# Patient Record
Sex: FEMALE | Race: ASIAN | NOT HISPANIC OR LATINO
[De-identification: names, ages, dates, MRNs, and addresses within clinical notes are randomized per-mention and may not be internally consistent; named-entity substitution may affect disease eponyms.]

---

## 2022-12-05 ENCOUNTER — APPOINTMENT (OUTPATIENT)
Dept: UROLOGY | Facility: CLINIC | Age: 57
End: 2022-12-05

## 2022-12-05 VITALS
OXYGEN SATURATION: 96 % | HEIGHT: 60 IN | TEMPERATURE: 97.5 F | SYSTOLIC BLOOD PRESSURE: 145 MMHG | BODY MASS INDEX: 22.97 KG/M2 | WEIGHT: 117 LBS | RESPIRATION RATE: 16 BRPM | DIASTOLIC BLOOD PRESSURE: 89 MMHG | HEART RATE: 89 BPM

## 2022-12-05 DIAGNOSIS — Z80.1 FAMILY HISTORY OF MALIGNANT NEOPLASM OF TRACHEA, BRONCHUS AND LUNG: ICD-10-CM

## 2022-12-05 DIAGNOSIS — Z00.00 ENCOUNTER FOR GENERAL ADULT MEDICAL EXAMINATION W/OUT ABNORMAL FINDINGS: ICD-10-CM

## 2022-12-05 DIAGNOSIS — Z78.9 OTHER SPECIFIED HEALTH STATUS: ICD-10-CM

## 2022-12-05 PROCEDURE — 99204 OFFICE O/P NEW MOD 45 MIN: CPT

## 2022-12-05 RX ORDER — LORATADINE AND PSEUDOEPHEDRINE 10; 240 MG/1; MG/1
10-240 TABLET, EXTENDED RELEASE ORAL
Qty: 15 | Refills: 0 | Status: ACTIVE | COMMUNITY
Start: 2022-12-03

## 2022-12-05 NOTE — LETTER BODY
[FreeTextEntry1] : Rosario Huerta MD\par [address] [facility] [address]\[place] [address]\par [phone]\par \par Dear Dr. Huerta, \par \par Reason for visit: Microscopic hematuria\par \par This is a 57 year-old Mandarin speaking woman with microscopic hematuria referred for evaluation. Her urinalysis demonstrated evidence of microscopic hematuria. She denies any gross hematuria, dysuria or urinary incontinence. The patient denies any aggravating or relieving factors. The patient denies any interference of function. The patient is entirely asymptomatic. All other review of systems are negative. She has lung cancer in her family medical history. She has no previous surgical history. Past medical history, family history and social history were inquired and were noncontributory to current condition. The patient does not use tobacco and does alcohol. Medications and allergies were reviewed. She has no known allergies to medication. \par \par On examination, the patient is a healthy-appearing woman in no acute distress. She is alert and oriented follows commands. She  has normal mood and affect. She is normocephalic. Neck is supple. Oral no thrush. Respirations are unlabored. Abdomen is soft and nontender. Bladder is nonpalpable. No CVA tenderness. Neurologically she is grossly intact. No peripheral edema. Skin without gross abnormality.\par \par Her urinalysis demonstrated evidence of microscopic hematuria. \par \par Assessment: Microscopic hematuria.\par \par I counseled the patient on the various etiologies of the microscopic hematuria. I discussed the risk of occult malignancy. I counseled the patient about microscopic hematuria. I recommended patient obtain urinalysis, urine cytology, cystoscopy, and renal ultrasound. I counseled the patient about the procedures. I answered the patient's questions. The risks and expected outcomes were discussed. The patient will follow up as directed and contact me with any questions or concerns. Thank you for the opportunity to participate in the care of Ms. BRADFORD. I will keep you updated on her progress.\par \par Plan: Cystoscopy. Renal ultrasound. Urinalysis. Urine cytology. Follow up as directed.

## 2022-12-05 NOTE — ADDENDUM
[FreeTextEntry1] : Entered by Giulia Garcia, acting as scribe for Dr. Ponce Mackey.\par The documentation recorded by the scribe accurately reflects the service I personally performed and the decisions made by me.

## 2022-12-06 ENCOUNTER — APPOINTMENT (OUTPATIENT)
Dept: UROLOGY | Facility: CLINIC | Age: 57
End: 2022-12-06

## 2022-12-06 VITALS
TEMPERATURE: 98 F | DIASTOLIC BLOOD PRESSURE: 96 MMHG | HEART RATE: 95 BPM | WEIGHT: 117 LBS | OXYGEN SATURATION: 98 % | RESPIRATION RATE: 16 BRPM | BODY MASS INDEX: 22.85 KG/M2 | SYSTOLIC BLOOD PRESSURE: 148 MMHG

## 2022-12-06 DIAGNOSIS — R31.21 ASYMPTOMATIC MICROSCOPIC HEMATURIA: ICD-10-CM

## 2022-12-06 PROCEDURE — 52000 CYSTOURETHROSCOPY: CPT

## 2022-12-06 PROCEDURE — 76775 US EXAM ABDO BACK WALL LIM: CPT

## 2022-12-11 LAB
APPEARANCE: CLEAR
BACTERIA: NEGATIVE
BILIRUBIN URINE: NEGATIVE
BLOOD URINE: ABNORMAL
COLOR: NORMAL
GLUCOSE QUALITATIVE U: NEGATIVE
HYALINE CASTS: 0 /LPF
KETONES URINE: NEGATIVE
LEUKOCYTE ESTERASE URINE: NEGATIVE
MICROSCOPIC-UA: NORMAL
NITRITE URINE: NEGATIVE
PH URINE: 7.5
PROTEIN URINE: NORMAL
RED BLOOD CELLS URINE: 2 /HPF
SPECIFIC GRAVITY URINE: 1.01
SQUAMOUS EPITHELIAL CELLS: 0 /HPF
URINE CYTOLOGY: NORMAL
UROBILINOGEN URINE: NORMAL
WHITE BLOOD CELLS URINE: 0 /HPF

## 2023-06-06 ENCOUNTER — APPOINTMENT (OUTPATIENT)
Dept: UROLOGY | Facility: CLINIC | Age: 58
End: 2023-06-06